# Patient Record
Sex: MALE | Race: WHITE | Employment: FULL TIME | URBAN - METROPOLITAN AREA
[De-identification: names, ages, dates, MRNs, and addresses within clinical notes are randomized per-mention and may not be internally consistent; named-entity substitution may affect disease eponyms.]

---

## 2017-01-24 ENCOUNTER — OFFICE VISIT (OUTPATIENT)
Dept: HEMATOLOGY | Age: 62
End: 2017-01-24

## 2017-01-24 DIAGNOSIS — B18.2 CHRONIC HEPATITIS C WITHOUT HEPATIC COMA (HCC): Primary | ICD-10-CM

## 2017-01-24 PROBLEM — R76.8 HCV ANTIBODY POSITIVE: Status: ACTIVE | Noted: 2017-01-24

## 2017-01-24 NOTE — PROGRESS NOTES
Vinh Workman MD, NATALIE Allen PA-C Sylvia Romance, MD, FACP, MD Tammie Farrar NP Iona Lavender, NP        [address] [facility], [address] [facility], [address]     [phone]     FAX: [address] [place] [address], [address] [place] [address], [address]     [phone]     FAX: [phone]     PCP: Gerardo Herbert MD     Patient Active Problem List   Diagnosis Code    Chronic hepatitis C ([facility] [address] B18.2    Hypertension I10    Migraine headache G43.909    GERD (gastroesophageal reflux disease) K21.9       Mr. Fredi Wilson is a 59 y.o. pleasant male who returns to the [address] for management of chronic hepatitis C, genotype 1. The active problem list, all pertinent past medical history, medications, liver histology, endoscopic studies, radiologic findings and laboratory findings related to the liver disorder were reviewed with the patient. Patient was previously treated in a clinical trial with peg-interferon, ribavirin, and an oral direct-acting antiviral. He did not respond and had to discontinue this treatment. He was then treatment with peg-interferon, ribavirin and telaprevir (2/29/12-4/16/12). This treatment was discontinued due to nonresponse to triple therapy. Patient finally achieved a sustained virologic response in one of our [facility] clinical trials in 2015 utilizing 3 direct-acting antivirals. Patient had a liver biopsy in 2013 which demonstrated bridging fibrosis. Patient returns to the office today to reassess his liver fibrosis with our in-office FibroScan after successful HCV treatment in 2015. Patient feels well today and has no new physical complaints. He completes his daily activities with no functional limitations.  Today, patient denies abdominal pain, change in bowel habits, dark urine, myalgias, arthralgias, pruritus and problems concentrating. No Known Allergies    Current Outpatient Prescriptions on File Prior to Visit   Medication Sig    LORazepam (ATIVAN) 1 mg tablet take 1 tablet by mouth twice a day if needed    traZODone (DESYREL) 50 mg tablet Take 1 Tab by mouth two (2) times a day.  zolpidem (AMBIEN) 10 mg tablet take 1 tablet by mouth at bedtime    nebivolol (BYSTOLIC) 10 mg tablet Take 10 mg by mouth daily.  esomeprazole (NEXIUM) 20 mg capsule Take 20 mg by mouth daily.  ibuprofen (MOTRIN) 800 mg tablet Take  by mouth every six (6) hours as needed. Last dose \"over 10 days ago. \"      No current facility-administered medications on file prior to visit. FAMILY HISTORY:  There is no family history of liver disease. SOCIAL HISTORY:  Patient is . He does not use tobacco  He consumed significant amounts of alcohol, 4-5 drinks daily. He now only consumes alcohol rarely/socially. He worked as a  for many years. He retired in 2015. PHYSICAL EXAMINATION:  Vital Signs: There were no vitals taken for this visit. General appearance: no distress  Eyes: sclera anicteric, right hordeolum  ENT: no oral lesions, thyroid normal  Nodes: no adenopathy  Skin: no spider angiomata, jaundice, palmar erythema or xanthalasma  Respiratory: clear to auscultation bilaterally  Cardiovascular: regular heart rate, no murmurs, no JVD  Abdomen: soft, non-tender, liver size normal to percussion/palpation. Spleen not palpable.  No obvious ascites  Extremities: no muscle wasting, no gross arthritic changes  : not examined  Neurologic: alert and oriented, cranial nerves grossly intact, no asterixis    LABORATORY:  Liver [facility] Ref Rng 1/27/2015 8/29/2013 11/29/2012   WBC 3.4 - 10.8 x10E3/uL 7.0 4.9 7.0   ANC 1.8 - 7.8 x10E3/uL      HGB 12.6 - 17.7 g/dL 17.2 16.0 16.5    - 379 x10E3/uL 223 206 241   INR 0.8 - 1.2 1. 0     AST 0 - 40 IU/L 69 (H) 69 (H) 63 (H)   ALT 0 - 44 IU/L 110 (H) 87 (H) 87 (H)   Alk Phos 39 - 117 IU/L 63 63 51   Bili, Total 0.0 - 1.2 mg/dL 1.1 0.8 0.5   Bili, Direct 0.00 - 0.40 mg/dL 0.35 0.28 0.22   Albumin 3.6 - 4.8 g/dL 4.8 4.2 4.5   BUN 8 - 27 mg/dL 15 17    Creat 0.76 - 1.27 mg/dL 1.01 0.91    Na 134 - 144 mmol/L 140 135    K 3.5 - 5.2 mmol/L 5.7 (H) 4.6    Cl 97 - 108 mmol/L 100 102    CO2 18 - 29 mmol/L 24 20    Glucose 65 - 99 mg/dL 97 97      A hepatic function panel and CBC were ordered today. Will review results when available. SEROLOGIES:  11/2010: HBsAntigen negative, anti-HBcore positive, anti-HBsurface positive,   anti-HCV positive,   11/2011: HAV total negative,  IL28B genotype CT. LIVER HISTOLOGY:  7/2011: Actve hepatitis with bridging firbosis. Biopsy slides need to be reviewed by MLS. 11/2013. Chronic hepatitis with bridging fibrosis approaching cirrhosis (grade 2 stage 3). 1/2017. FibroScan performed at [facility] [employer] of [address] [place] [address]. EkPa was 4.9. Suggested fibrosis level is F0. ENDOSCOPIC PROCEDURES:  Not available    RADIOLOGY:  Not available     OTHER TESTING:  Not available     ASSESSMENT AND PLAN:  Chronic hepatitis C, genotype 1 with no fibrosis. This was confirmed with FibroScan today. Results were discussed in detail with patient. A liver biopsy in 2011 and 2013 demonstrated bridging fibrosis/early cirrhosis. Successful HCV treatment in March 2015 and decreasing alcohol consumption has significantly improved his liver disease. He will continue regular follow up in [facility] registry study for patients that have successfully completed therapy. HCV treatment. Cured with all-oral triple antiviral therapy in March 2015. Patient is very stable from a liver standpoint. Patient was counseled regarding alcohol consumption and the need to keep it at a minimum. Follow up visit in 6 months.         Shama Cheney NP  Liver [place] of [address] [place] [address]  [address] , [address] [place] [address]  Ph: [phone]  Fax: [phone]  Email: [email or web address]

## 2018-03-01 ENCOUNTER — OFFICE VISIT (OUTPATIENT)
Dept: HEMATOLOGY | Age: 63
End: 2018-03-01

## 2018-03-01 VITALS
HEART RATE: 68 BPM | WEIGHT: 250 LBS | TEMPERATURE: 97 F | BODY MASS INDEX: 33.91 KG/M2 | DIASTOLIC BLOOD PRESSURE: 99 MMHG | OXYGEN SATURATION: 97 % | SYSTOLIC BLOOD PRESSURE: 159 MMHG

## 2018-03-01 DIAGNOSIS — R76.8 HCV ANTIBODY POSITIVE: Primary | ICD-10-CM

## 2018-03-01 DIAGNOSIS — I10 ESSENTIAL HYPERTENSION: ICD-10-CM

## 2018-03-01 RX ORDER — SUMATRIPTAN 50 MG/1
TABLET, FILM COATED ORAL
COMMUNITY
Start: 2018-02-21

## 2018-03-01 NOTE — MR AVS SNAPSHOT
[address] [facility] [phone] [address] [facility] 
[phone] Patient: Adriano Casas MRN: [id number] DGN:[id number] Visit Information Date & Time Provider Department Dept. Phone Encounter #  
 3/1/2018  9:00 AM April LAUREN Azul, [address] [facility] 219 [id number] Upcoming Health Maintenance Date Due DTaP/Tdap/Td series (1 - Tdap) 7/30/1974 FOBT Q 1 YEAR AGE 50-75 7/30/2003 ZOSTER VACCINE AGE 60> 5/30/2013 Influenza Age 5 to Adult 8/1/2017 Allergies as of 3/1/2018  Review Complete On: 3/1/2018 By: Shona Damian NP No Known Allergies Current Immunizations  Never Reviewed No immunizations on file. Not reviewed this visit You Were Diagnosed With   
  
 Codes Comments HCV antibody positive    -  Primary ICD-10-CM: R76.8 ICD-9-CM: 795.79 Essential hypertension     ICD-10-CM: I10 
ICD-9-CM: 401.9 Vitals BP Pulse Temp Weight(growth percentile) SpO2 BMI  
 (!) 159/99 (BP 1 Location: Left arm, BP Patient Position: Sitting) 68 97 °F (36.1 °C) (Tympanic) 250 lb (113.4 kg) 97% 33.91 kg/m2 Smoking Status Never Smoker Vitals History BMI and BSA Data Body Mass Index Body Surface Area  
 33.91 kg/m 2 2.4 m 2 Preferred Pharmacy Pharmacy Name Phone [address] [address] Your Updated Medication List  
  
   
This list is accurate as of 3/1/18  9:33 AM.  Always use your most recent med list.  
  
  
  
  
 BYSTOLIC 10 mg tablet Generic drug:  nebivolol Take 10 mg by mouth daily. ibuprofen 800 mg tablet Commonly known as:  MOTRIN Take  by mouth every six (6) hours as needed. Last dose \"over 10 days ago. \" LORazepam 1 mg tablet Commonly known as:  ATIVAN  
take 1 tablet by mouth twice a day if needed NexIUM 20 mg capsule Generic drug:  esomeprazole Take 20 mg by mouth daily. SUMAtriptan 50 mg tablet Commonly known as:  IMITREX  
  
 traZODone 50 mg tablet Commonly known as:  Elvis Ulloai Take 1 Tab by mouth two (2) times a day. zolpidem 10 mg tablet Commonly known as:  AMBIEN  
take 1 tablet by mouth at bedtime We Performed the Following CBC W/O DIFF [66198 CPT(R)] HCV RNA BY CEDRIC QL,RFLX TO QT [08818 CPT(R)] METABOLIC PANEL, COMPREHENSIVE [86686 CPT(R)] Introducing [facility] SERVICES! Dear Malcolm Chapa: Thank you for requesting a [employer] account. Our records indicate that you already have an active [employer] account. You can access your account anytime at [email or web address] Did you know that you can access your hospital and ER discharge instructions at any time in [employer]? You can also review all of your test results from your hospital stay or ER visit. Additional Information If you have questions, please visit the Frequently Asked Questions section of the [employer] website at [email or web address] Remember, [employer] is NOT to be used for urgent needs. For medical emergencies, dial 911. Now available from your iPhone and Android! Please provide this summary of care documentation to your next provider. Your primary care clinician is listed as Edyth Gilford. If you have any questions after today's visit, please call [phone].

## 2018-03-02 LAB
ALBUMIN SERPL-MCNC: 4.7 G/DL (ref 3.6–4.8)
ALBUMIN/GLOB SERPL: 1.6 {RATIO} (ref 1.2–2.2)
ALP SERPL-CCNC: 57 IU/L (ref 39–117)
ALT SERPL-CCNC: 43 IU/L (ref 0–44)
AST SERPL-CCNC: 32 IU/L (ref 0–40)
BILIRUB SERPL-MCNC: 0.7 MG/DL (ref 0–1.2)
BUN SERPL-MCNC: 16 MG/DL (ref 8–27)
BUN/CREAT SERPL: 13 (ref 10–24)
CALCIUM SERPL-MCNC: 10.2 MG/DL (ref 8.6–10.2)
CHLORIDE SERPL-SCNC: 103 MMOL/L (ref 96–106)
CO2 SERPL-SCNC: 23 MMOL/L (ref 18–29)
CREAT SERPL-MCNC: 1.23 MG/DL (ref 0.76–1.27)
ERYTHROCYTE [DISTWIDTH] IN BLOOD BY AUTOMATED COUNT: 13.4 % (ref 12.3–15.4)
GFR SERPLBLD CREATININE-BSD FMLA CKD-EPI: 62 ML/MIN/1.73
GFR SERPLBLD CREATININE-BSD FMLA CKD-EPI: 71 ML/MIN/1.73
GLOBULIN SER CALC-MCNC: 3 G/DL (ref 1.5–4.5)
GLUCOSE SERPL-MCNC: 103 MG/DL (ref 65–99)
HCT VFR BLD AUTO: 44.3 % (ref 37.5–51)
HGB BLD-MCNC: 15.4 G/DL (ref 13–17.7)
MCH RBC QN AUTO: 31.6 PG (ref 26.6–33)
MCHC RBC AUTO-ENTMCNC: 34.8 G/DL (ref 31.5–35.7)
MCV RBC AUTO: 91 FL (ref 79–97)
PLATELET # BLD AUTO: 273 X10E3/UL (ref 150–379)
POTASSIUM SERPL-SCNC: 5.3 MMOL/L (ref 3.5–5.2)
PROT SERPL-MCNC: 7.7 G/DL (ref 6–8.5)
RBC # BLD AUTO: 4.87 X10E6/UL (ref 4.14–5.8)
SODIUM SERPL-SCNC: 142 MMOL/L (ref 134–144)
WBC # BLD AUTO: 6.5 X10E3/UL (ref 3.4–10.8)

## 2018-03-03 LAB — HCV RNA SERPL QL NAA+PROBE: NEGATIVE

## 2018-03-05 NOTE — PROGRESS NOTES
[address] Laurie Colón MD, Jin Fernandez, Orlando Wade Alexander     April NATALIE Plascencia PA-C Elenora Congo, MD, MD Tammie Meadows NP [place], NP        [address] [facility], [address] [facility]     [address] [facility] [address].     [phone]     FAX: [address] [place] [address]     [address] [facility] [address], [address]     [address] [facility], [address]     [phone]     FAX: [phone]     PCP: Alma Rubio MD     Patient Active Problem List   Diagnosis Code    Hypertension I10    Migraine headache G43.909    GERD (gastroesophageal reflux disease) K21.9    HCV antibody positive R76.8       Mr. Derrick Mcfarlane is a 57 y.o. pleasant male who returns to the [place] [address] for management of chronic hepatitis C, genotype 1. The active problem list, all pertinent past medical history, medications, liver histology, endoscopic studies, radiologic findings and laboratory findings related to the liver disorder were reviewed with the patient. Patient was previously treated in a clinical trial with peg-interferon, ribavirin, and an oral direct-acting antiviral. He did not respond and had to discontinue this treatment. He was then treatment with peg-interferon, ribavirin and telaprevir (2/29/12-4/16/12). This treatment was discontinued due to nonresponse to triple therapy. Patient finally achieved a sustained virologic response in one of our [facility] clinical trials in 2015 utilizing 3 direct-acting antivirals. Patient had a liver biopsy in 2013 which demonstrated bridging fibrosis. FibroScan was performed in 2017 to reassess his fibrosis after successful HCV treatment in 2015. This suggested no fibrosis with a low score of 4.9 kPa. Patient feels well today and has no new physical complaints. He completes his daily activities with no functional limitations.  Today, patient denies abdominal pain, change in bowel habits, dark urine, myalgias, arthralgias, pruritus and problems concentrating. No Known Allergies    Current Outpatient Prescriptions on File Prior to Visit   Medication Sig    LORazepam (ATIVAN) 1 mg tablet take 1 tablet by mouth twice a day if needed    traZODone (DESYREL) 50 mg tablet Take 1 Tab by mouth two (2) times a day.  zolpidem (AMBIEN) 10 mg tablet take 1 tablet by mouth at bedtime    nebivolol (BYSTOLIC) 10 mg tablet Take 10 mg by mouth daily.  esomeprazole (NEXIUM) 20 mg capsule Take 20 mg by mouth daily.  ibuprofen (MOTRIN) 800 mg tablet Take  by mouth every six (6) hours as needed. Last dose \"over 10 days ago. \"      No current facility-administered medications on file prior to visit. FAMILY HISTORY:  There is no family history of liver disease. SOCIAL HISTORY:  Patient is . He does not use tobacco  He consumed significant amounts of alcohol, 4-5 drinks daily. He now only consumes alcohol rarely/socially. He worked as a  for many years. He retired in 2015. PHYSICAL EXAMINATION:  Vital Signs:   Visit Vitals    BP (!) 159/99 (BP 1 Location: Left arm, BP Patient Position: Sitting)    Pulse 68    Temp 97 °F (36.1 °C) (Tympanic)    Wt 250 lb (113.4 kg)    SpO2 97%    BMI 33.91 kg/m2     General appearance: no distress  Eyes: sclera anicteric. ENT: no oral lesions, thyroid normal  Nodes: no adenopathy  Skin: no spider angiomata, jaundice, palmar erythema or xanthalasma  Respiratory: clear to auscultation bilaterally  Cardiovascular: regular heart rate, no murmurs, no JVD  Abdomen: soft, non-tender, liver size normal to percussion/palpation. Spleen not palpable.  No obvious ascites  Extremities: no muscle wasting, no gross arthritic changes  : not examined  Neurologic: alert and oriented, cranial nerves grossly intact, no asterixis    LABORATORY:  Liver [place] of [address] Units 3/1/2018 1/27/2015   WBC 3.4 - 10.8 x10E3/uL 6.5 7.0   HGB 13.0 - 17.7 g/dL 15.4 17.2    - 379 x10E3/uL 273 223   INR 0.8 - 1.2  1.0   AST 0 - 40 IU/L 32 69 (H)   ALT 0 - 44 IU/L 43 110 (H)   Alk Phos 39 - 117 IU/L 57 63   Bili, Total 0.0 - 1.2 mg/dL 0.7 1.1   Bili, Direct 0.00 - 0.40 mg/dL  0.35   Albumin 3.6 - 4.8 g/dL 4.7 4.8   BUN 8 - 27 mg/dL 16 15   Creat 0.76 - 1.27 mg/dL 1.23 1.01   Na 134 - 144 mmol/L 142 140   K 3.5 - 5.2 mmol/L 5.3 (H) 5.7 (H)   Cl 96 - 106 mmol/L 103 100   CO2 18 - 29 mmol/L 23 24   Glucose 65 - 99 mg/dL 103 (H) 97   Virology Latest Ref Rng & Units 3/1/2018 1/27/2015   HCV RNA, CEDRIC, QL Negative Negative Positive (A)     A hepatic function panel and CBC were ordered today. Will review results when available. SEROLOGIES:  11/2010: HBsAntigen negative, anti-HBcore positive, anti-HBsurface positive,   anti-HCV positive,   11/2011: HAV total negative,  IL28B genotype CT. LIVER HISTOLOGY:  7/2011: Actve hepatitis with bridging firbosis. Biopsy slides need to be reviewed by MLS. 11/2013. Chronic hepatitis with bridging fibrosis approaching cirrhosis (grade 2 stage 3). 1/2017. FibroScan performed at [address]. EkPa was 4.9. Suggested fibrosis level is F0. ENDOSCOPIC PROCEDURES:  Not available    RADIOLOGY:  2016. US of liver. Fatty liver. Otherwise normal.    OTHER TESTING:  Not available     ASSESSMENT AND PLAN:  Chronic hepatitis C, genotype 1 with no fibrosis after successful HCV treatment. A liver biopsy in 2011 and 2013 demonstrated bridging fibrosis. Successful HCV treatment in March 2015 and decreasing alcohol consumption has significantly improved his liver disease. HCV treatment. Cured with all-oral triple antiviral therapy in March 2015. Patient is very stable from a liver standpoint. He is a sustained virologic responder to treatment, or cured. Will review HCV RNA one last time to confirm that he has remained virus negative.  Discussed with patient in detail (30 minutes, more than half the office visit) that he will never be able to donate blood, he can be an organ donor if he chooses, he does not have active immunity and to keep alcohol intake to a minimum. He will no longer need to follow up in our clinic. Encouraged regular follow up with his PCP. Patient verbalized understanding of this. Hypertension. Patient's BP is significantly elevated in the clinic today. Discussed the importance of regular follow up with their PCP to monitor/manage this. Patient verbalized understanding. Patient was counseled regarding alcohol consumption and the need to keep it at a minimum.     Follow up Raquel Capps, NP  Liver [address]  Ph: [phone]  Fax: [phone]  Email: [email or web address]